# Patient Record
(demographics unavailable — no encounter records)

---

## 2019-08-14 NOTE — DIAGNOSTIC IMAGING REPORT
EXAM: CT Abdomen and Pelvis WITH intravenous contrast  



INDICATION: Left lower quadrant abdominal pain



COMPARISON: Report of CT abdomen and pelvis from 11/23/2015. Images are not

available for comparison.



TECHNIQUE: Abdomen and pelvis were scanned utilizing a multidetector helical

scanner from the lung base to the pubic symphysis after administration of IV

contrast. Coronal and sagittal reformations were obtained. Routine protocol was

performed. Scan was performed when during portal venous phase.    

     

IV CONTRAST: 100mL of Isovue 370

ORAL CONTRAST: Gastrografin

            

COMPLICATIONS: None



RADIATION DOSE:

Total DLP:  386.6 mGy*cm



Dose modulation, iterative reconstruction, and/or weight based adjustment of

the mA/kV was utilized to reduce the radiation dose to as low as reasonably

achievable. 



FINDINGS:

LOWER THORAX: Normal.



HEPATOBILIARY: Diffuse hepatic steatosis. No focal liver lesion. No biliary

ductal dilation. Normal gallbladder.



SPLEEN: No splenomegaly.



PANCREAS: No focal masses or ductal dilatation.



ADRENALS: No adrenal nodules.

KIDNEYS/URETERS: 4 mm distal left ureteral calculus with mild left hydroureter

and minimal left hydronephrosis. No right hydronephrosis or renal calculi.

Right upper pole 1.1 cm renal cyst.

PELVIC ORGANS/BLADDER: Unremarkable.



PERITONEUM / RETROPERITONEUM: No free air or fluid.

LYMPH NODES: No lymphadenopathy.

VESSELS: Scattered atherosclerotic calcifications of the nonaneurysmal

abdominal aorta and major branches.



GI TRACT: Mild colonic diverticulosis. No CT evidence of diverticulitis. No

abnormal bowel wall thickening. No bowel obstruction.



BONES AND SOFT TISSUES: No acute osseous injury. No suspicious lytic or blastic

lesions. Mild degenerative changes of the visualized spine.



IMPRESSION: 

4 mm distal left ureteral calculus resulting in mild left hydroureter and

minimal left hydronephrosis. No right hydronephrosis or renal calculi.



Hepatic steatosis.



Signed by: Nay Pat MD on 8/14/2019 8:32 AM

## 2019-09-27 NOTE — XMS REPORT
Patient Summary Document

                             Created on: 2019



JUAN R LARA

External Reference #: [id number]

: 1970

Sex: Male



Demographics







                          Address                   [address] [place] [address]

 

                          Home Phone                [phone]

 

                          Preferred Language        Unknown

 

                          Marital Status            Unknown

 

                          Temple Affiliation     Unknown

 

                          Race                      Unknown

 

                          Ethnic Group              Unknown





Author







                          Author                    [facility]

 

                          Address                   Unknown

 

                          Phone                     Unavailable







Care Team Providers







                    Care Team Member Name    Role                Phone

 

                    LALIT GONZALEZ      Unavailable         Unavailable







Problems

This patient has no known problems.



Allergies, Adverse Reactions, Alerts

This patient has no known allergies or adverse reactions.



Medications

This patient has no known medications.



Results







           Test Description    Test Time    Test Comments    Text Results    Atomic Results    Result

 Comments

 

                CT ABDOMEN/PELVIS W    2019 08:24:00                                                       

                                                   [place] [address]      Patient Name: JUAN R LARA JR                                  
MR #: [id number]                     : 1970                            
      Age/Sex: 49/M  Acct #: [id number]                              Req #: 
[id number]  Adm Physician:                                                      
Ordered by: DERECK MENEZES MD                            Report #: [id number]
[id number]        Location: ER                                      Room/Bed:         
           
___________________________________________________________________________________________________
   Procedure: [id number] CT/CT ABDOMEN/PELVIS W  Exam Date: 19             
              Exam Time: 0805                                              
REPORT STATUS: Signed    EXAM: CT Abdomen and Pelvis WITH intravenous contrast  
     INDICATION: Left lower quadrant abdominal pain      COMPARISON: Report of 
CT abdomen and pelvis from 2015. Images are not   available for 
comparison.      TECHNIQUE: Abdomen and pelvis were scanned utilizing a 
multidetector helical   scanner from the lung base to the pubic symphysis after 
administration of IV   contrast. Coronal and sagittal reformations were 
obtained. Routine protocol was   performed. Scan was performed when during 
portal venous phase.               IV CONTRAST: 100mL of Isovue 370   ORAL 
CONTRAST: Gastrografin                  COMPLICATIONS: None      RADIATION DOSE:
  Total DLP:  386.6 mGy*cm      Dose modulation, iterative reconstruction, 
and/or weight based adjustment of   the mA/kV was utilized to reduce the 
radiation dose to as low as reasonably   achievable.       FINDINGS:   LOWER 
THORAX: Normal.      HEPATOBILIARY: Diffuse hepatic steatosis. No focal liver 
lesion. No biliary   ductal dilation. Normal gallbladder.      SPLEEN: No 
splenomegaly.      PANCREAS: No focal masses or ductal dilatation.      
ADRENALS: No adrenal nodules.   KIDNEYS/URETERS: 4 mm distal left ureteral 
calculus with mild left hydroureter   and minimal left hydronephrosis. No right 
hydronephrosis or renal calculi.   Right upper pole 1.1 cm renal cyst.   PELVIC 
ORGANS/BLADDER: Unremarkable.      PERITONEUM / RETROPERITONEUM: No free air or 
fluid.   LYMPH NODES: No lymphadenopathy.   VESSELS: Scattered atherosclerotic 
calcifications of the nonaneurysmal   abdominal aorta and major branches.      
GI TRACT: Mild colonic diverticulosis. No CT evidence of diverticulitis. No   
abnormal bowel wall thickening. No bowel obstruction.      BONES AND SOFT 
TISSUES: No acute osseous injury. No suspicious lytic or blastic   lesions. Mild
degenerative changes of the visualized spine.      IMPRESSION:    4 mm distal 
left ureteral calculus resulting in mild left hydroureter and   minimal left 
hydronephrosis. No right hydronephrosis or renal calculi.      Hepatic 
steatosis.      Signed by: Julita Root MD on 2019 8:32 AM        Dictated 
By: JULITA ROOT MD  Electronically Signed By: JULITA ROTO MD on 19  
Transcribed By: LEONARDO on 19       COPY TO:   DERECK MENEZES MD

## 2019-09-28 NOTE — OPERATIVE REPORT
DATE OF PROCEDURE:  09/27/2019

 

SURGEON:  Partha Mccoy MD

 

PREOPERATIVE DIAGNOSES:  

1. Microscopic hematuria.

2. Left ureteral calculus.

3. Left hydronephrosis.

 

POSTOPERATIVE DIAGNOSES:  

1. Microscopic hematuria.

2. Left ureteral calculus.

3. Left hydronephrosis.

 

PROCEDURES:  

1. Cystourethroscopy with right ureteral catheterization and right retrograde pyelogram

(separate procedure for microscopic hematuria). 

2. Left-sided ureteroscopy with laser lithotripsy (entirely separate procedure to debulk

a large left ureteral calculi). 

3. Left-sided ureteroscopy with stone extraction (entirely separate procedure with

explicit purpose of sending stones for analysis, not required for laser lithotripsy). 

4. Supervision of fluoroscopy.

5. Interpretation of retrograde pyelography.

 

ANESTHESIA:  General.

 

ESTIMATED BLOOD LOSS:  Minimal.

 

COMPLICATIONS:  None.

 

INDICATIONS FOR PROCEDURE:  Mr. Felipe is a very pleasant 49-year-old male, who has

failed a trial of passage of stone.  He and I had a long discussion about alternatives,

risks, and benefits of doing nothing, shock wave lithotripsy, ureteroscopy, percutaneous

surgery or open surgery.  He voiced understanding of options, alternatives, risks, and

benefits and he elected to procedure __________. 

 

PROCEDURE IN DETAIL:  After informed consent was obtained, the patient was taken to the

operative suite, placed supine on the operating table, underwent general anesthesia by

Anesthesia Service.  He was placed in the dorsal lithotomy position, prepped and draped

in standard fashion for cystoscopy.  A 21-Chinese cystoscope was inserted per urethra.

Normal urethra was noted.  Panendoscopy of the bladder revealed no tumors and no stones.

 Both ureteral orifices were in normal anatomic location and position.  Bilateral

retrograde pyelogram was performed.  The right was normal.  Left revealed a 5 mm distal

ureteral filling defect.  A guidewire was inserted.  Ureteroscope was advanced to the

level of the renal stone.  Utilizing a 365 micron laser fiber, the stone was broken into

multiple fragments.  Several of which were basket extracted and passed off the table as

specimen.  Retrograde pyelogram was performed through the ureteroscope showing no other

stones.  Safety wire was removed.  The bladder was drained.  The patient was awakened

from anesthesia and transferred to the recovery room in excellent condition. 

 

Supervision of fluoroscopy and interpretation of retrograde pyelography:  I was present

for the entire procedure and supervised fluoroscopy.  There was no radiologist present.

Attention was turned to the left and right ureteral orifices, which were catheterized.

Retrograde pyelogram was 

performed.  The right was normal __________ 5 mm filling defect with proximal

hydronephrosis, postoperatively this was removed. 

 

 

 

 

______________________________

MD ZULEIMA Rey/MODL

D:  09/27/2019 17:37:02

T:  09/28/2019 00:35:00

Job #:  [id number]